# Patient Record
(demographics unavailable — no encounter records)

---

## 2024-10-23 NOTE — PHYSICAL EXAM
[Normal] : no acute distress, well nourished, well developed and well-appearing [de-identified] : limited to observation only

## 2024-10-23 NOTE — HISTORY OF PRESENT ILLNESS
[Home] : at home, [unfilled] , at the time of the visit. [Medical Office: ([facility])___] : at the medical office located in  [Verbal consent obtained from patient] : the patient, [unfilled] [Family Member] : family member [FreeTextEntry1] : In need for a letter stating her medical conditions.  [de-identified] :  The patient is a 68-year-old F with PMHx of HTN. HLD, T2DM, OA, thalassemia trait, hypothyroidism, right breast lumpectomy and hysterectomy who presents today for a letter stating her medical conditions.  Patient is in need for snap benefits, she is requesting a letter stating her health conditions.  Patient has limited mobility 2/2 chronic back pain and OA of b/l knees. currently using walker to ambulate. She is getting homecare 7 days a week for 4.5hrs.

## 2024-10-23 NOTE — PHYSICAL EXAM
[Normal] : no acute distress, well nourished, well developed and well-appearing [de-identified] : limited to observation only

## 2024-10-23 NOTE — HISTORY OF PRESENT ILLNESS
[Home] : at home, [unfilled] , at the time of the visit. [Medical Office: ([facility])___] : at the medical office located in  [Verbal consent obtained from patient] : the patient, [unfilled] [Family Member] : family member [FreeTextEntry1] : In need for a letter stating her medical conditions.  [de-identified] :  The patient is a 68-year-old F with PMHx of HTN. HLD, T2DM, OA, thalassemia trait, hypothyroidism, right breast lumpectomy and hysterectomy who presents today for a letter stating her medical conditions.  Patient is in need for snap benefits, she is requesting a letter stating her health conditions.  Patient has limited mobility 2/2 chronic back pain and OA of b/l knees. currently using walker to ambulate. She is getting homecare 7 days a week for 4.5hrs.

## 2025-01-17 NOTE — HISTORY OF PRESENT ILLNESS
[FreeTextEntry1] : blood work diabetes follow up [de-identified] : PMH: diabetes, post-ablative hypothyroidism, chronic LBP  1 - diabetes - metformin for 2 years. a1c's have always been less than 7 2- hypothyroidism - had hyperthyroidism, had ablation; now on levothyroxine 3- chronic LBP - MRI 2021 showing multilevel spondylosis; mild canal stenosis; takes gabapentin. Continues to have muscular low back pain 4- has had symptomatic palpitations - well-controlled with nadolol; no arrhythmias found on cardiac w/u 5- GERD - chronic, has seen GI in the past; had EGD in 2020 2/2 gastric perforation due to ingested chicken bone. Takes famotidine daily. Occasionally will need to take omeprazole in addition. Sometimes related to food intake.  Lives with daughter  passed away in [place] during COVID

## 2025-04-09 NOTE — HISTORY OF PRESENT ILLNESS
[FreeTextEntry1] : dysuria diabetes [de-identified] : PMH: diabetes, post-ablative hypothyroidism, chronic LBP  After last visit, a1c 6.5 and we decided to hold metformin and monitor a1c.  1- has been noticing mild burning with urination for the past few days. No hematuria, frequency, fevers/chills, N/V, abdominal pain.  2- concerned about recurrent painful mouth sores that develop on inner lips, sometimes on inner cheeks. associated with burning pain.   her sister in [place] was recently diagnosed with rectal cancer. Also notes several relatives with various cancers as well.

## 2025-04-09 NOTE — PHYSICAL EXAM
[No Acute Distress] : no acute distress [Well Nourished] : well nourished [Well Developed] : well developed [Well-Appearing] : well-appearing [de-identified] : there is a small blister to the inner lower lip and possibly a second one that has ruptured; dentition with diffuse plaque; tongue with normal papillae/no lesions

## 2025-04-09 NOTE — PHYSICAL EXAM
[No Acute Distress] : no acute distress [Well Nourished] : well nourished [Well Developed] : well developed [Well-Appearing] : well-appearing [de-identified] : there is a small blister to the inner lower lip and possibly a second one that has ruptured; dentition with diffuse plaque; tongue with normal papillae/no lesions

## 2025-04-09 NOTE — HISTORY OF PRESENT ILLNESS
[FreeTextEntry1] : dysuria diabetes [de-identified] : PMH: diabetes, post-ablative hypothyroidism, chronic LBP  After last visit, a1c 6.5 and we decided to hold metformin and monitor a1c.  1- has been noticing mild burning with urination for the past few days. No hematuria, frequency, fevers/chills, N/V, abdominal pain.  2- concerned about recurrent painful mouth sores that develop on inner lips, sometimes on inner cheeks. associated with burning pain.   her sister in [place] was recently diagnosed with rectal cancer. Also notes several relatives with various cancers as well.

## 2025-04-09 NOTE — PLAN
[FreeTextEntry1] : ------------------------------  f/u Leah 1 month for  1- dizziness 2- memory 3- phq9

## 2025-05-19 NOTE — REVIEW OF SYSTEMS
[Dyspnea on Exertion] : dyspnea on exertion [Joint Pain] : joint pain [Joint Stiffness] : joint stiffness [Joint Swelling] : joint swelling [Back Pain] : back pain [Dizziness] : dizziness [Memory Loss] : memory loss [Negative] : Heme/Lymph

## 2025-05-20 NOTE — PHYSICAL EXAM
[de-identified] : Telehealth precludes traditional, comprehensive physical exam. Patient appeared stable and alert.

## 2025-05-20 NOTE — HEALTH RISK ASSESSMENT
[No] : In the past 12 months have you used drugs other than those required for medical reasons? No [No falls in past year] : Patient reported no falls in the past year [Assistive Device] : Patient uses an assistive device [0] : 2) Feeling down, depressed, or hopeless: Not at all (0) [Never] : Never [PHQ-2 Negative - No further assessment needed] : PHQ-2 Negative - No further assessment needed [de-identified] : cane [[id number]] : 0 [With Patient/Caregiver] : , with patient/caregiver [AdvancecareDate] : 05/28 [FreeTextEntry4] : Discussed the importance of having a healthcare proxy to ensure that medical decisions are made according to the patient's wishes if they are unable to communicate. Encouraged patient to follow up with their primary care provider at the next visit to discuss this further. Patient can complete the form at the PCP office or download it from the [place] website.

## 2025-05-20 NOTE — HISTORY OF PRESENT ILLNESS
[Family Member] : family member [Home] : at home, [unfilled] , at the time of the visit. [Other Location: e.g. Home (Enter Location, City,State)___] : at [unfilled] [Telehealth (audio & video)] : This visit was provided via telehealth using real-time 2-way audio visual technology. [Verbal consent obtained from patient] : the patient, [unfilled] [de-identified] : [facility] quality initiative provider note: 69 year old female with history of multiple medical issues. Denies hospitalization, surgery, or new medical condition including COVID in the past year. Patient is being seen today to confirm diagnoses reported from [facility]. Patient report doing well. No complaints offered at this time. Awake, alert and oriented x4, in no acute distress. Denies any symptoms or concerns at this time. Patient verified medications and medical diagnosis. Report taking medications as prescribed. Patient receives 31.5 hours of [facility] services. Patient was diagnosed with depression by NP on 9/10/24. Initially denied any history of depression and reported feeling fine but later disclosed experiencing depressive symptoms related to the loss of her  in 2021 and recent life transitions, including moving in with her daughter. Referred to behavioral health services at the time but did not follow up. Patient decline any services during the visit as well. Daughter claims the family is encouraging activity to support emotional well-being.  BP: denies monitoring at-home BS: denies monitoring at-home A1c: 6.7 GFR: 91

## 2025-05-20 NOTE — HISTORY OF PRESENT ILLNESS
[Family Member] : family member [Home] : at home, [unfilled] , at the time of the visit. [Other Location: e.g. Home (Enter Location, City,State)___] : at [unfilled] [Telehealth (audio & video)] : This visit was provided via telehealth using real-time 2-way audio visual technology. [Verbal consent obtained from patient] : the patient, [unfilled] [de-identified] : [facility] quality initiative provider note: 69 year old female with history of multiple medical issues. Denies hospitalization, surgery, or new medical condition including COVID in the past year. Patient is being seen today to confirm diagnoses reported from [facility]. Patient report doing well. No complaints offered at this time. Awake, alert and oriented x4, in no acute distress. Denies any symptoms or concerns at this time. Patient verified medications and medical diagnosis. Report taking medications as prescribed. Patient receives 31.5 hours of [facility] services. Patient was diagnosed with depression by NP on 9/10/24. Initially denied any history of depression and reported feeling fine but later disclosed experiencing depressive symptoms related to the loss of her  in 2021 and recent life transitions, including moving in with her daughter. Referred to behavioral health services at the time but did not follow up. Patient decline any services during the visit as well. Daughter claims the family is encouraging activity to support emotional well-being.  BP: denies monitoring at-home BS: denies monitoring at-home A1c: 6.7 GFR: 91

## 2025-05-20 NOTE — HEALTH RISK ASSESSMENT
[No] : In the past 12 months have you used drugs other than those required for medical reasons? No [No falls in past year] : Patient reported no falls in the past year [Assistive Device] : Patient uses an assistive device [0] : 2) Feeling down, depressed, or hopeless: Not at all (0) [Never] : Never [PHQ-2 Negative - No further assessment needed] : PHQ-2 Negative - No further assessment needed [de-identified] : cane [[id number]] : 0 [With Patient/Caregiver] : , with patient/caregiver [AdvancecareDate] : 05/28 [FreeTextEntry4] : Discussed the importance of having a healthcare proxy to ensure that medical decisions are made according to the patient's wishes if they are unable to communicate. Encouraged patient to follow up with their primary care provider at the next visit to discuss this further. Patient can complete the form at the PCP office or download it from the [place] website.

## 2025-05-20 NOTE — PHYSICAL EXAM
[de-identified] : Telehealth precludes traditional, comprehensive physical exam. Patient appeared stable and alert.

## 2025-06-20 NOTE — HISTORY OF PRESENT ILLNESS
[FreeTextEntry1] : mouth lesions  [de-identified] : PMH: diabetes, post-ablative hypothyroidism, chronic LBP  Continues to have intermittent lesions in the mouth. Via a photo there is a bulla to the inner lip surface. Pt reports they come intermittently. Cannot eat spicy food because they get irritated.  Continues to have memory troubles - has difficult remembering things after some time.     in [place] in . Her son got  and moved out, she feels he is not as concerned with her recently. Busy with his own life and kids. Currently lives with daughter who has thalassemia and needs regular blood transfusions. There is often conflict between them.  Support system is made up of her sisters mostly.

## 2025-06-20 NOTE — PLAN
[FreeTextEntry1] : ------------------------------  f/u Leah 2 months for  - diabetes labs - mood.

## 2025-06-20 NOTE — PHYSICAL EXAM
[No Acute Distress] : no acute distress [Well Nourished] : well nourished [Well Developed] : well developed [de-identified] : mini cog 5/5

## 2025-06-20 NOTE — PHYSICAL EXAM
[No Acute Distress] : no acute distress [Well Nourished] : well nourished [Well Developed] : well developed [de-identified] : mini cog 5/5

## 2025-06-20 NOTE — HISTORY OF PRESENT ILLNESS
[FreeTextEntry1] : mouth lesions  [de-identified] : PMH: diabetes, post-ablative hypothyroidism, chronic LBP  Continues to have intermittent lesions in the mouth. Via a photo there is a bulla to the inner lip surface. Pt reports they come intermittently. Cannot eat spicy food because they get irritated.  Continues to have memory troubles - has difficult remembering things after some time.     in [place] in . Her son got  and moved out, she feels he is not as concerned with her recently. Busy with his own life and kids. Currently lives with daughter who has thalassemia and needs regular blood transfusions. There is often conflict between them.  Support system is made up of her sisters mostly.

## 2025-07-18 NOTE — ASSESSMENT
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

## 2025-07-18 NOTE — HISTORY OF PRESENT ILLNESS
[FreeTextEntry1] : F/u oral lesions, hair loss [de-identified] : Ms. PARDEEP THOMPSON is a 69-year-old female PMH thalassemia trait who presents for:    1) Lip lesions - Previously evaluated by us in 2023 - pt never got clobetasol, nor did she do labs for BP Abs or DGs  - Pt reports that she thinks these lesions come and go but is unsure as they have been present for years - Pt saw a dentist for this in the past but never had a biopsy - Pt reports pain with spicy foods  2) Hair loss, scalp - Pt noted recent increased shedding from scalp  - Denies recent illness, new medications, life stressors

## 2025-07-18 NOTE — HISTORY OF PRESENT ILLNESS
[FreeTextEntry1] : F/u oral lesions, hair loss [de-identified] : Ms. PARDEEP THOMPSON is a 69-year-old female PMH thalassemia trait who presents for:    1) Lip lesions - Previously evaluated by us in 2023 - pt never got clobetasol, nor did she do labs for BP Abs or DGs  - Pt reports that she thinks these lesions come and go but is unsure as they have been present for years - Pt saw a dentist for this in the past but never had a biopsy - Pt reports pain with spicy foods  2) Hair loss, scalp - Pt noted recent increased shedding from scalp  - Denies recent illness, new medications, life stressors

## 2025-07-18 NOTE — PHYSICAL EXAM
[Alert] : alert [Oriented x 3] : ~L oriented x 3 [Well Nourished] : well nourished [Conjunctiva Non-injected] : conjunctiva non-injected [No Visual Lymphadenopathy] : no visual  lymphadenopathy [No Clubbing] : no clubbing [No Edema] : no edema [No Bromhidrosis] : no bromhidrosis [No Chromhidrosis] : no chromhidrosis [Declined] : declined [FreeTextEntry3] : Focused exam only (see below) per patient request: - Few firm translucent papules on lower mucosal lip  - Decreased hair density on bilateral frontotemporal scalp and widened central part - (-) hair pull test

## 2025-07-18 NOTE — ASSESSMENT
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